# Patient Record
Sex: FEMALE | Race: WHITE | Employment: STUDENT | URBAN - METROPOLITAN AREA
[De-identification: names, ages, dates, MRNs, and addresses within clinical notes are randomized per-mention and may not be internally consistent; named-entity substitution may affect disease eponyms.]

---
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
## 2023-11-28 ENCOUNTER — ORDER TRANSCRIPTION (OUTPATIENT)
Dept: ADMINISTRATIVE | Facility: HOSPITAL | Age: 23
End: 2023-11-28

## 2023-11-28 DIAGNOSIS — R59.0 LYMPHADENOPATHY OF RIGHT CERVICAL REGION: Primary | ICD-10-CM

## 2023-12-13 NOTE — IMAGING NOTE
Pt scheduled for US lymph node bx tomorrow. Ordered with sedation, however does not have recent H&P. Pt reports she is away at college and has not seen MD recently. Explained procedure thoroughly and states she will call her MD for oral sedative and try to get through procedure. Explained that if she is unable to get through procedure, she may reschedule at a later time and get required H&P. Pt verbalized understanding of all explanations.

## 2023-12-14 ENCOUNTER — NURSE ONLY (OUTPATIENT)
Dept: LAB | Facility: HOSPITAL | Age: 23
End: 2023-12-14
Attending: OTOLARYNGOLOGY
Payer: COMMERCIAL

## 2023-12-14 ENCOUNTER — HOSPITAL ENCOUNTER (OUTPATIENT)
Dept: ULTRASOUND IMAGING | Facility: HOSPITAL | Age: 23
Discharge: HOME | End: 2023-12-14
Attending: OTOLARYNGOLOGY
Payer: COMMERCIAL

## 2023-12-14 DIAGNOSIS — R59.1 LYMPHADENOPATHY: ICD-10-CM

## 2023-12-14 DIAGNOSIS — R59.0 LYMPHADENOPATHY OF RIGHT CERVICAL REGION: ICD-10-CM

## 2023-12-14 PROCEDURE — 76942 ECHO GUIDE FOR BIOPSY: CPT | Performed by: OTOLARYNGOLOGY

## 2023-12-14 PROCEDURE — 88173 CYTOPATH EVAL FNA REPORT: CPT | Performed by: OTOLARYNGOLOGY

## 2023-12-14 PROCEDURE — 38505 NEEDLE BIOPSY LYMPH NODES: CPT | Performed by: OTOLARYNGOLOGY

## 2023-12-21 LAB
CD10 CELLS NFR SPEC: <1 %
CD10/CD19: <1 %
CD19 CELLS NFR SPEC: 17 %
CD19+/CD200+: 9 %
CD2 CELLS NFR SPEC: 79 %
CD20 CELLS NFR SPEC: 17 %
CD200 CELLS: 23 %
CD3 CELLS NFR SPEC: 78 %
CD3+/TCRGD+: 2 %
CD3+CD4+ CELLS NFR SPEC: 60 %
CD3+CD4+ CELLS/CD3+CD8+ CLL SPEC: 3.8
CD3+CD8+ CELLS NFR SPEC: 16 %
CD3-/CD56+: 1 %
CD34 CELLS NFR SPEC: <1 %
CD38 CELLS NFR SPEC: 1 %
CD38+/CD19+: <1 %
CD45 CELLS NFR SPEC: 100 %
CD5 CELLS NFR SPEC: 82 %
CD5/CD19 CELLS: 1 %
CD7 CELLS NFR SPEC: 78 %
CELL SURF KAPPA/LAMBDA RATIO: 1.1
CELL SURF LAMBDA LIGHT CHAIN: 8 %
CELL SURFACE KAPPA LIGHT CHAIN: 9 %
TCR G-D CELLS NFR SPEC: 2 %

## 2023-12-22 LAB — NON GYNE INTERPRETATION: NEGATIVE
